# Patient Record
Sex: FEMALE | Race: OTHER
[De-identification: names, ages, dates, MRNs, and addresses within clinical notes are randomized per-mention and may not be internally consistent; named-entity substitution may affect disease eponyms.]

---

## 2018-09-04 ENCOUNTER — APPOINTMENT (OUTPATIENT)
Dept: DERMATOLOGY | Facility: CLINIC | Age: 23
End: 2018-09-04

## 2018-09-27 ENCOUNTER — APPOINTMENT (OUTPATIENT)
Dept: DERMATOLOGY | Facility: CLINIC | Age: 23
End: 2018-09-27
Payer: COMMERCIAL

## 2018-09-27 PROCEDURE — 99213 OFFICE O/P EST LOW 20 MIN: CPT

## 2019-01-16 ENCOUNTER — APPOINTMENT (OUTPATIENT)
Dept: ORTHOPEDIC SURGERY | Facility: CLINIC | Age: 24
End: 2019-01-16
Payer: COMMERCIAL

## 2019-01-16 VITALS
BODY MASS INDEX: 23.99 KG/M2 | SYSTOLIC BLOOD PRESSURE: 117 MMHG | WEIGHT: 144 LBS | HEIGHT: 65 IN | TEMPERATURE: 98.3 F | HEART RATE: 72 BPM | DIASTOLIC BLOOD PRESSURE: 71 MMHG

## 2019-01-16 PROCEDURE — 99203 OFFICE O/P NEW LOW 30 MIN: CPT

## 2019-01-16 PROCEDURE — 73562 X-RAY EXAM OF KNEE 3: CPT | Mod: RT

## 2019-01-16 NOTE — DISCUSSION/SUMMARY
[de-identified] : 23 year old female present in the office today in regards to her R knee pain. I am not recommending sx. I recommended patient a course of formal physical therapy. Therefore, a prescription for therapy was given. In regards to her pronation with weightbearing, I suggested she utilize inserts for her shoes such as PowerSteps. I also advised her to use ice and NSAIDs or Tylenol prn. F/U 2 months. \par \par

## 2019-01-16 NOTE — PHYSICAL EXAM
[Normal] : Gait: normal [LE] : Sensory: Intact in bilateral lower extremities [DP] : dorsalis pedis 2+ and symmetric bilaterally [PT] : posterior tibial 2+ and symmetric bilaterally [Poor Appearance] : well-appearing [Acute Distress] : not in acute distress [Obese] : not obese [de-identified] : General appearance: Well nourished, well developed, pleasant, alert, and oriented x3.\par Respiratory: Breathing not labored, in no acute distress.\par HEENT: Normocephalic. EOM intact. Sclerae are clear.\par CV: No apparent abnormalities. No lower leg edema. No varicosities. Pedal pulses are palpable.\par Neurologic: Sensation is intact to light touch in the upper and lower extremities. No muscle weakness.\par Dermatologic: No apparent skin lesions or rash.\par Spine: C spine and L spine appear normal and move freely, normal and nontender.\par Upper Extremities: Hands, wrists, and elbows are normal and move freely. Shoulders are normal and move freely. All range of motion is symmetrical.\par Normal body habitus. Pulses are palpable.\par Review of systems, please see form for complete details. Medical data sheet was reviewed.\par  \par Left knee, FROM hip, no effusion, 0 - 140 degrees, no crepitus, no medial pain, no lateral pain, no Lachman, no pivot shift, no anterior drawer, no posterior drawer, stable, anatomic alignment. 1QG, -5 degree tilt, + pain at the lateral facet. \par Right knee, FROM hip, no effusion, 0 - 140 degrees, no swelling, mild crepitus, no medial pain, + trace lateral pain, no Lachman, no pivot shift, no anterior drawer, no posterior drawer, stable, anatomic alignment. 1.5 QG, -5 degree tilt, mild clicking. + pain lateral facet. \par \par BL Standing Exam\par Pronation with weightbearing.  [de-identified] : Xrays, taken at the office today show:\par R Knee xrays:\par Standing AP, Lateral, and Merchant films:\par No significant DJD, Normal alignment, good joint space maintained, well tracking patella, no acute or chronic abnormalities. patella tracks well. \par \par \par \par MRI of the R knee reviewed today by Dr. Greer shows mild chondromalacia, no meniscus tear. \par Please see attached report for details.\par \par \par \par \par \par \par

## 2019-01-16 NOTE — HISTORY OF PRESENT ILLNESS
[Pain Location] : pain [de-identified] : Patient is a 23 year old female who presents c/o right knee pain. patient states has bump to anterior knee from falls in past, now painful. patient states knee is feeling weak, patient locates pain to anterior knee, describes as dull pain. positive locking and buckling, no clicking or swelling. patient had hx of PT years ago

## 2019-01-16 NOTE — ADDENDUM
[FreeTextEntry1] : Documented by Katelyn Georges acting as a scribe for Dr. Greer on 01/16/2019 \par \par All medical record entries made by the Scribe were at my, Dr. Greer, direction and\par personally dictated by me on 01/16/2019 . I have reviewed the chart and agree that the record\par accurately reflects my personal performance of the history, physical exam, procedure and imaging.\par

## 2019-01-29 ENCOUNTER — OTHER (OUTPATIENT)
Age: 24
End: 2019-01-29

## 2019-02-27 ENCOUNTER — RECORD ABSTRACTING (OUTPATIENT)
Age: 24
End: 2019-02-27

## 2019-02-27 ENCOUNTER — APPOINTMENT (OUTPATIENT)
Dept: ORTHOPEDIC SURGERY | Facility: CLINIC | Age: 24
End: 2019-02-27

## 2019-03-27 ENCOUNTER — APPOINTMENT (OUTPATIENT)
Dept: DERMATOLOGY | Facility: CLINIC | Age: 24
End: 2019-03-27

## 2019-04-12 ENCOUNTER — APPOINTMENT (OUTPATIENT)
Dept: DERMATOLOGY | Facility: CLINIC | Age: 24
End: 2019-04-12
Payer: COMMERCIAL

## 2019-04-12 PROCEDURE — 17110 DESTRUCTION B9 LES UP TO 14: CPT

## 2019-04-12 PROCEDURE — 99214 OFFICE O/P EST MOD 30 MIN: CPT | Mod: 25

## 2020-12-09 ENCOUNTER — APPOINTMENT (OUTPATIENT)
Dept: NEUROLOGY | Facility: CLINIC | Age: 25
End: 2020-12-09
Payer: COMMERCIAL

## 2020-12-09 VITALS
BODY MASS INDEX: 25.83 KG/M2 | TEMPERATURE: 98.3 F | WEIGHT: 155 LBS | HEIGHT: 65 IN | SYSTOLIC BLOOD PRESSURE: 115 MMHG | DIASTOLIC BLOOD PRESSURE: 70 MMHG

## 2020-12-09 DIAGNOSIS — Z78.9 OTHER SPECIFIED HEALTH STATUS: ICD-10-CM

## 2020-12-09 DIAGNOSIS — G44.89 OTHER HEADACHE SYNDROME: ICD-10-CM

## 2020-12-09 DIAGNOSIS — G24.5 BLEPHAROSPASM: ICD-10-CM

## 2020-12-09 DIAGNOSIS — R06.83 SNORING: ICD-10-CM

## 2020-12-09 DIAGNOSIS — R42 DIZZINESS AND GIDDINESS: ICD-10-CM

## 2020-12-09 PROCEDURE — 99204 OFFICE O/P NEW MOD 45 MIN: CPT

## 2020-12-09 PROCEDURE — 99072 ADDL SUPL MATRL&STAF TM PHE: CPT

## 2020-12-09 NOTE — ASSESSMENT
[FreeTextEntry1] : This is a 25-year-old woman with headache, dizziness, and possible blepharospasm.\par She had no involuntary motor activity during examination today.\par \par Imaging has been negative.\par \par Symptoms may be secondary to stress.\par I would like to obtain an EEG given the dizzy spells.\par I will assess for sleep apnea as this can be associated with headaches and dizziness.\par You could consider referring her for cardiac evaluation as well this has not already been done.\par \par I have explained that there are essentially 2 strategies for dealing with chronic headaches.  The first is abortive medication of which there are numerous over-the-counter preparations as well as prescription options.  The second strategy is prophylactic medications.  I have explained that these are medications which prevent headaches when taken on a regular basis.  I have explained that there are several medications which have been found to be preventative against headaches.  I have further explained that none of the medications have an immediate effect.  They must build up in the system over a few weeks.  Most people notice that within a few weeks on the medication, the headache intensity is diminishing.  Within a few more weeks most people begin to notice that the frequency is decreasing as well.  I have explained that the preventive medications were all originally used for other conditions but were also found to work against chronic headaches.  The patient seemed to understand my explanation.\par \par She does not wish to be on daily medication.\par I have given her an instruction sheet for some simple exercises she could do at home for the headaches.\par \par I will see the patient back in 4 months. \par \par

## 2020-12-09 NOTE — HISTORY OF PRESENT ILLNESS
[FreeTextEntry1] : I saw this patient in the office today.\par \par She describes that since October of 2020 she has been having lightheaded sensations particularly when she moves around quickly.\par It feels as if she is going to pass out.\par She has noticed this interferes with her exercises.\par She has also noticed some twitching around her right eye.\par \par She also reports a history of headache.\par Prior to October it would occur only occasionally.\par She reports that it increased to a daily pattern.\par \par She also reports that numerous family members have told her that she snores.

## 2020-12-09 NOTE — DATA REVIEWED
[de-identified] : Head CT was performed on 11/27/2020 at [facility].\par The study was unremarkable.

## 2020-12-09 NOTE — REASON FOR VISIT
[Consultation] : a consultation visit [FreeTextEntry1] : CC: headache, dizziness,  and eye twitching.

## 2020-12-09 NOTE — CONSULT LETTER
[Dear  ___] : Dear  [unfilled], [Courtesy Letter:] : I had the pleasure of seeing your patient, [unfilled], in my office today. [Please see my note below.] : Please see my note below. [Consult Closing:] : Thank you very much for allowing me to participate in the care of this patient.  If you have any questions, please do not hesitate to contact me. [Sincerely,] : Sincerely, [FreeTextEntry3] : Steven Cage MD.

## 2020-12-16 ENCOUNTER — APPOINTMENT (OUTPATIENT)
Dept: NEUROLOGY | Facility: CLINIC | Age: 25
End: 2020-12-16
Payer: COMMERCIAL

## 2020-12-16 PROCEDURE — 99072 ADDL SUPL MATRL&STAF TM PHE: CPT

## 2020-12-16 PROCEDURE — 95806 SLEEP STUDY UNATT&RESP EFFT: CPT

## 2021-01-26 ENCOUNTER — APPOINTMENT (OUTPATIENT)
Dept: NEUROLOGY | Facility: CLINIC | Age: 26
End: 2021-01-26

## 2021-01-29 ENCOUNTER — OUTPATIENT (OUTPATIENT)
Dept: OUTPATIENT SERVICES | Facility: HOSPITAL | Age: 26
LOS: 1 days | End: 2021-01-29
Payer: COMMERCIAL

## 2021-01-29 DIAGNOSIS — Z20.828 CONTACT WITH AND (SUSPECTED) EXPOSURE TO OTHER VIRAL COMMUNICABLE DISEASES: ICD-10-CM

## 2021-01-29 LAB — SARS-COV-2 RNA SPEC QL NAA+PROBE: SIGNIFICANT CHANGE UP

## 2021-01-29 PROCEDURE — C9803: CPT

## 2021-01-29 PROCEDURE — U0003: CPT

## 2021-01-29 PROCEDURE — U0005: CPT

## 2021-01-30 DIAGNOSIS — Z20.828 CONTACT WITH AND (SUSPECTED) EXPOSURE TO OTHER VIRAL COMMUNICABLE DISEASES: ICD-10-CM

## 2021-02-02 ENCOUNTER — APPOINTMENT (OUTPATIENT)
Dept: NEUROLOGY | Facility: CLINIC | Age: 26
End: 2021-02-02
Payer: COMMERCIAL

## 2021-02-02 PROCEDURE — 93040 RHYTHM ECG WITH REPORT: CPT

## 2021-02-02 PROCEDURE — 99072 ADDL SUPL MATRL&STAF TM PHE: CPT

## 2021-02-02 PROCEDURE — 95819 EEG AWAKE AND ASLEEP: CPT

## 2021-02-04 ENCOUNTER — NON-APPOINTMENT (OUTPATIENT)
Age: 26
End: 2021-02-04

## 2021-03-23 ENCOUNTER — APPOINTMENT (OUTPATIENT)
Dept: ORTHOPEDIC SURGERY | Facility: CLINIC | Age: 26
End: 2021-03-23
Payer: COMMERCIAL

## 2021-03-23 DIAGNOSIS — M22.40 CHONDROMALACIA PATELLAE, UNSPECIFIED KNEE: ICD-10-CM

## 2021-03-23 DIAGNOSIS — M51.26 OTHER INTERVERTEBRAL DISC DISPLACEMENT, LUMBAR REGION: ICD-10-CM

## 2021-03-23 DIAGNOSIS — M92.521 JUVENILE OSTEOCHONDROSIS OF TIBIA TUBERCLE, RIGHT LEG: ICD-10-CM

## 2021-03-23 DIAGNOSIS — M25.561 PAIN IN RIGHT KNEE: ICD-10-CM

## 2021-03-23 DIAGNOSIS — M54.16 RADICULOPATHY, LUMBAR REGION: ICD-10-CM

## 2021-03-23 PROCEDURE — 73564 X-RAY EXAM KNEE 4 OR MORE: CPT | Mod: RT

## 2021-03-23 PROCEDURE — 99072 ADDL SUPL MATRL&STAF TM PHE: CPT

## 2021-03-23 PROCEDURE — 99215 OFFICE O/P EST HI 40 MIN: CPT

## 2021-03-23 RX ORDER — MELOXICAM 7.5 MG/1
7.5 TABLET ORAL
Qty: 21 | Refills: 0 | Status: COMPLETED | COMMUNITY
Start: 2021-03-23 | End: 2021-04-13

## 2021-03-24 NOTE — DISCUSSION/SUMMARY
[de-identified] : MADELAINE is a 25 year female with right knee pain secondary to chondromalacia and patellofemoral pain syndrome. I have explained to the patient the anatomy of the patellofemoral joint. It includes the extensor mechanism (quadriceps tendon, quadriceps muscles, patella, patella tendon, and medial and lateral retinaculum). I have shown the patient that the articular cartilage gets a little softened. This is what is known as chondromalacia. If one theoretically were to remove or scrape all the articular cartilage, it would be identified as arthritis. Somewhat paradoxically, however, chondromalacia does not necessarily lead to arthritis or at least there is no evidence irrefutable at this point in time.\par \par Chondromalacia or anterior knee pain is a syndrome that hurts the patients more than it causes destruction to the knee; thus pain is not associated with destruction. Likewise, noises, cracking, and popping for the most part are not anymore significant than people cracking their knuckles. It is certainly not a sign of damage to the joint.\par Over 90% of the people with patellofemoral problems will get better if they understand the weight bearing stresses that are applied to the patellofemoral joint. The forces can range from 2 to 9 times your body weight per step and with abnormal alignment the average is usually higher. \par \par The treatment is to minimize weight-bearing stress and to strengthen the surrounding muscles. From a practical point of view, one can divide their lifestyle into activities of daily living, conditioning, and recreation. In activities of daily living one should feel free to walk around as necessary but only for functioning. If one has an option between stairs and an escalator, the latter is preferable.\par \par The conditioning aspect should be a non weight bearing program which is best achieved by bicycle riding at least 3 to 4 days a week. It should be done with increasing resistance for at least a half hour. The seat of the bike should always be kept at a position so that the knee stays within a 0 to 90 degree arc. This will avoid hyperextension and flexion beyond 90 degrees, which tends to aggravate symptoms of discomfort. Leg extension for stretching exercises are similarly kept within this arc of motion. Step machines are not advised as they tend to aggravate patellofemoral symptoms as do squats. A [employer] machine is an alternative that is usually acceptable.\par \par The recreational part of their life is based on the fact that since pain is not leading to destruction, we will compromise and allow a recreational lifestyle. If indeed activity, albeit associated with impact does not yield any symptoms, they should feel free to participate. If an increase in activities is associated with increasing discomfort, the patient should use "common sense" and cut back on the level of activity. Recreation, however, is never to be used for conditioning even in an asymptomatic patient. The patient must always maintain a conditioning program. I think the patient understands this explanation.\par \par While over 90% of the people with this syndrome will do well non-operatively, some conscientious patients will still have symptoms. If so, they should be reevaluated to ascertain if they fall into a small category of people who require physical therapy or surgery.\par \par At this time she elected for nonoperative management with meloxicam and physical therapy. I gave her prescription for both. On exam she does have more symptoms related to lumar disc herniation that I believe requires attention prior to her starting PT. She was referred to Dr Her due to this. She will refrain from PT until he says she can. I suggested an oral steroid but due to recent palpitations we will avoid them. She will take mobic as directed instead. She agrees with the above plan and all questions were answered.
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
## 2021-03-24 NOTE — PHYSICAL EXAM
[de-identified] : Physical Exam:\par General: Well appearing, no acute distress, A&O\par Neurologic: A&Ox3, No focal deficits\par Head: NCAT without abrasions, lacerations, or ecchymosis to head, face, or scalp\par Eyes: No scleral icterus, no gross abnormalities\par Respiratory: Equal chest wall expansion bilaterally, no accessory muscle use\par Lymphatic: No lymphadenopathy palpated\par Skin: Warm and dry\par \par \par L-Spine\par Palpation: Tenderness to paraspinal muscles\par ROM: side bending, symmetrical. Pain with extension and flexion\par Reflexes: normal\par \par \par Right Knee: Range of Motion in Degrees	\par 	  Claimant:	Normal:	\par Flexion Active	 135 	 135-degrees	\par Flexion Passive	 135	 135-degrees	\par Extension Active	 0-5	 0-5-degrees	\par Extension Passive	 0-5	 0-5-degrees	\par \par Tenderness over the tibial tubercle. No tenderness over the tendon at this time. No weakness to flexion/extension. Tenderness over the pes bursa. No evidence of instability in the AP plane or varus or valgus stress. Negative Lachman. Negative pivot shift. Negative anterior drawer test. Negative posterior drawer test. Negative Nataly. Negative Apley grind. No medial or lateral joint line tenderness. No tenderness over the medial and lateral facet of the patella. No patellofemoral crepitations. No lateral tilting patella. No patella apprehension. No crepitation in the medial and lateral femoral condyle. No proximal or distal swelling, edema or tenderness. No gross motor or sensory deficits. No intra-articular swelling. Extra-articular swelling over the proximal medial aspect of the tibia. 2+ DP and PT pulses. No varus or valgus malalignment. Skin is intact. No rashes, scars or lesions. \par  \par Left Knee: Range of Motion in Degrees	\par 	  Claimant:	Normal:	\par Flexion Active	 135 	 135-degrees	\par Flexion Passive	 135	 135-degrees	\par Extension Active	 0-5	 0-5-degrees	\par Extension Passive	 0-5	 0-5-degrees	\par \par No weakness to flexion/extension. No evidence of instability in the AP plane or varus or valgus stress. Negative Lachman. Negative pivot shift. Negative anterior drawer test. Negative posterior drawer test. Negative Nataly. Negative Apley grind. No medial or lateral joint line tenderness. No tenderness over the medial and lateral facet of the patella. No patellofemoral crepitations. No lateral tilting patella. No patellar apprehension. No crepitation in the medial and lateral femoral condyle. No proximal or distal swelling, edema or tenderness. No gross motor or sensory deficits. No intra-articular swelling. 2+ DP and PT pulses. No varus or valgus malalignment. Skin is intact. No rashes, scars or lesions. [de-identified] : 4 views of R knee were performed today and available for me to review. Results were discussed with the patient. They demonstrate no f/x, dislocation or other deformity.\par \par DATE OF EXAM: 07/03/2018\par \par MRI-3T RIGHT KNEE NON CONTRAST\par \par IMPRESSION:\par \par Fibrillation of the inferomedial retropatellar cartilage compatible with mild\par chondromalacia patella.\par \par Patellar Chondromalacia, M94.261

## 2021-04-07 ENCOUNTER — APPOINTMENT (OUTPATIENT)
Dept: PHYSICAL MEDICINE AND REHAB | Facility: CLINIC | Age: 26
End: 2021-04-07
Payer: COMMERCIAL

## 2021-04-07 VITALS
WEIGHT: 155 LBS | HEART RATE: 73 BPM | DIASTOLIC BLOOD PRESSURE: 73 MMHG | HEIGHT: 65 IN | BODY MASS INDEX: 25.83 KG/M2 | SYSTOLIC BLOOD PRESSURE: 132 MMHG

## 2021-04-07 VITALS — TEMPERATURE: 97.5 F

## 2021-04-07 DIAGNOSIS — M54.5 LOW BACK PAIN: ICD-10-CM

## 2021-04-07 DIAGNOSIS — M79.18 MYALGIA, OTHER SITE: ICD-10-CM

## 2021-04-07 DIAGNOSIS — M54.2 CERVICALGIA: ICD-10-CM

## 2021-04-07 PROCEDURE — 99204 OFFICE O/P NEW MOD 45 MIN: CPT

## 2021-04-07 PROCEDURE — 99072 ADDL SUPL MATRL&STAF TM PHE: CPT

## 2021-04-07 RX ORDER — DICLOFENAC SODIUM 75 MG/1
75 TABLET, DELAYED RELEASE ORAL
Qty: 60 | Refills: 0 | Status: ACTIVE | COMMUNITY
Start: 2021-04-07 | End: 1900-01-01

## 2021-04-07 NOTE — ASSESSMENT
[FreeTextEntry1] : Ms. MADELAINE SCHUTSER is a 25 year old female who presents with chronic low back > neck pain, likely myofascial in nature, unlikely radiculopathy as patient denies any radicular pain at this time. Denies any red flag signs. Will recommend:\par - Start PT 2-3x/week for stretching, strengthening, ROM exercises, HEP and modalities PRN including myofascial release, moist heat, and TENS therapy \par - Will have patient switched from Mobic to Diclofenac 75mg BID. Patient advised on cardiac/gi/renal side effects. Patient encouraged to take medication with food. \par - Encouraged patient to use Lidocaine Patches on areas of pain, no more than 3 at a time, 12 hours on/off. \par \par RTC in 3 weeks. If no significant improvement, can consider imaging at that time. Patient in agreement with plan.

## 2021-04-07 NOTE — PHYSICAL EXAM
[FreeTextEntry1] : PE:\par Constitutional: In NAD, calm and cooperative\par HEENT: NCAT, Anicteric sclera, no lid-lag\par Cardio: Extremities appear pink and well perfused, no peripheral edema\par Respiratory: Normal respiratory effort on room air, no accessory muscle use\par Skin: no rashes seen on exposed skin, no visible abrasions\par Psych: Normal affect, intact judgment and insight\par Neuro: Awake, alert and oriented x 3, see below for focused neurological exam\par MSK (Neck):\par 	Inspection: no gross swelling identified\par 	Palpation: Tenderness of the bilateral lower cervical paraspinals\par 	ROM: Pain at end cervical extension/flexion but AROM intact\par 	Strength: 5/5 strength in bilateral upper extremities\par 	Reflexes: 2+ Biceps/Brachioradialis reflex bilaterally, Sinha’s negative bilaterally\par 	Sensation: Intact to light touch in bilateral upper extremities\par 	Special tests: Spurling’s test negative bilaterally \par \par MSK (Back)\par 	Inspection: no gross swelling identified\par 	Palpation: Tenderness of the bilateral lower lumbar paraspinals\par 	ROM: Pain at end lumbar extension/flexion\par 	Strength: 5/5 strength in bilateral lower extremities\par 	Reflexes: 2+ Patella reflex bilaterally, 2+ Achilles reflex bilaterally, negative clonus bilaterally\par 	Sensation: Intact to light touch in bilateral lower extremities\par Special tests:\par SLR:negative bilaterally\par RENAE:negative bilaterally\par FADIR: negative bilaterally
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
## 2021-04-07 NOTE — DATA REVIEWED
[FreeTextEntry1] : PATIENT NAME: Cassie Stephens\par PATIENT PHONE NUMBER:\par PATIENT ID: [id number]\par : 1995\par DATE OF EXAM: 2018\par R. Phys. Name: Les Lazcano\par R. Phys. Address: [address] [place] [address]\par R. Phys. Phone: [phone]\par MRI-3T LUMBAR SPINE NON CONTRAST\par \par HISTORY: M62.81 Muscle weakness M79.604 Right Leg Pain M79.605 Left Leg Pain\par M62.830 Spasm of back muscles R20.2 Upper and Lower Extremity Pins and Needles\par \par TECHNIQUE: MRI of the lumbar spine was performed 3.0 Rody ultra high field magnetic\par resonance imaging unit with multiple sequences in sagittal and axial planes.\par \par \par COMPARISON: No prior studies available for comparison.\par \par FINDINGS:\par ALIGNMENT: There is a slight levoconvex rotatory curvature. The usual lordosis is\par maintained.\par \par VERTEBRAL BODIES:The vertebral bodies are normal in height.\par \par MARROW SIGNAL:The vertebral bodies exhibit normal marrow signal.\par \par DISC SPACES:The disc spaces are normal in height and demonstrate normal signal intensity.\par \par \par L1-2:There is no disc herniation or stenosis.\par \par \par L2-3: There is no disc herniation or stenosis.\par \par \par L3-4: There is no disc herniation or stenosis.\par \par \par L4-5: There is no disc herniation or stenosis.\par \par \par L5-S1: There is mild disc bulging favoring the left and mild facet hypertrophy with\par narrowing of the left lateral recess with mild encroachment upon the left S1 nerve root\par \par The region of the conus medullaris lies at the L1 level and is unremarkable.\par \par \par IMPRESSION:\par \par Slight levoconvex rotatory curvature\par \par At L5-S1, there is disc bulging favoring the left and mild facet hypertrophy with\par narrowing of the left lateral recess with mild encroachment upon the left S1 nerve root\par \par \par \par Facet arthritis of lumbar region, M47.816\par \par \par Signed by: Elda Nguyen MD\par Signed Date: 2018 5:41 PM EDT\par \par \par SIGNED BY: Elda Nguyen MD, Ext. [phone] 05:41 PM\par

## 2021-04-21 ENCOUNTER — APPOINTMENT (OUTPATIENT)
Dept: NEUROLOGY | Facility: CLINIC | Age: 26
End: 2021-04-21

## 2021-05-05 ENCOUNTER — APPOINTMENT (OUTPATIENT)
Dept: PHYSICAL MEDICINE AND REHAB | Facility: CLINIC | Age: 26
End: 2021-05-05

## 2022-07-21 ENCOUNTER — OFFICE (OUTPATIENT)
Dept: URBAN - METROPOLITAN AREA CLINIC 12 | Facility: CLINIC | Age: 27
Setting detail: OPHTHALMOLOGY
End: 2022-07-21
Payer: COMMERCIAL

## 2022-07-21 DIAGNOSIS — H52.13: ICD-10-CM

## 2022-07-21 PROCEDURE — SCREF LASIK EVAL: Performed by: OPHTHALMOLOGY

## 2022-07-21 ASSESSMENT — REFRACTION_CURRENTRX
OD_CYLINDER: -0.50
OD_OVR_VA: 20/
OD_AXIS: 180
OS_OVR_VA: 20/
OS_VPRISM_DIRECTION: SV
OS_AXIS: 159
OS_CYLINDER: -0.50
OD_SPHERE: -3.75
OD_VPRISM_DIRECTION: SV
OS_SPHERE: -3.50

## 2022-07-21 ASSESSMENT — KERATOMETRY
OD_K1POWER_DIOPTERS: 44.75
OS_K2POWER_DIOPTERS: 46.00
OD_K2POWER_DIOPTERS: 45.75
OD_AXISANGLE_DEGREES: 078
OS_AXISANGLE_DEGREES: 093
OS_K1POWER_DIOPTERS: 45.00

## 2022-07-21 ASSESSMENT — TONOMETRY
OD_IOP_MMHG: 17
OS_IOP_MMHG: 15

## 2022-07-21 ASSESSMENT — CONFRONTATIONAL VISUAL FIELD TEST (CVF)
OD_FINDINGS: FULL
OS_FINDINGS: FULL

## 2022-07-21 ASSESSMENT — REFRACTION_MANIFEST
OD_SPHERE: -3.00
OD_AXIS: 001
OS_VA1: 20/20-2
OD_VA1: 20/20
OS_AXIS: 157
OS_SPHERE: -3.25
OD_CYLINDER: -0.75
OS_CYLINDER: -0.50

## 2022-07-21 ASSESSMENT — REFRACTION_AUTOREFRACTION
OS_AXIS: 157
OD_AXIS: 001
OD_CYLINDER: -0.75
OS_SPHERE: -3.25
OD_SPHERE: -3.00
OS_CYLINDER: -0.50

## 2022-07-21 ASSESSMENT — SPHEQUIV_DERIVED
OD_SPHEQUIV: -3.375
OS_SPHEQUIV: -3.5
OD_SPHEQUIV: -3.375
OS_SPHEQUIV: -3.5

## 2022-07-21 ASSESSMENT — AXIALLENGTH_DERIVED
OS_AL: 24.2381
OS_AL: 24.2381
OD_AL: 24.2837
OD_AL: 24.2837

## 2022-07-21 ASSESSMENT — VISUAL ACUITY
OD_BCVA: 20/25+2
OS_BCVA: 20/20-2